# Patient Record
(demographics unavailable — no encounter records)

---

## 2025-04-07 NOTE — ASSESSMENT
[FreeTextEntry1] : Mr. HERNÁNDEZ is a 51 year-old male, with a past medical history as noted above, who present to the office today for fasting labs and a check up

## 2025-04-07 NOTE — CURRENT MEDS
[Side Effects] :  side effects [Yes] : Reviewed medication list for presence of high-risk medications. [Opioids] : opioids

## 2025-04-07 NOTE — HISTORY OF PRESENT ILLNESS
[FreeTextEntry1] : Fasting labs  [de-identified] : Mr. HERNÁNDEZ is a 51 year-old-male, with a past medical history as noted below, who present to the office today for fasting labs and a checkup. states he has been exercising routinely working on cutting back on his body fat and increase ROM States his BP is elevated secondary to only getting a few hours of sleep. States he worked a double shift.  When he checks his BP at home has been normal  Denies HA, CP,SOB/SIMPSON or recent infection - Having dental work done

## 2025-04-07 NOTE — PLAN
[FreeTextEntry1] : Cardiopulmonary -HTN  Patient was advised the importance of participating in aerobic exercise programs improve their stamina.  DEVAN was encouraged to continue with his exercise program Advised low sodium diet and try to keep his BMI under 25.  Check CBC, CMP, and FLP.   Refilled Losartan/ HCTZ- RTO 2-3 weeks for BP check if elevated start Norvasc.  Dermatology DEVAN was encouraged to wear sun protective clothing, sun block, and proper use of SPF board brim hats, to seek shade and to avoid the sun in peak hours.  ABCD of skin moles was advised.  Thinning hair-continue finasteride-Refilled medication today.   screening for prostate cancer - Check PSA  GI screening for colon cancer - Education given - Follow up with GI  Adult BMI screening and follow-up:  The patient's BMI is about 27. Counseled patient regarding BMI, healthy eating, portion control and exercise importance of diet to maintain a healthy weight.  Counseled patient on importance of exercise to maintain a healthy weight  Advised weight loss advised low-sodium diet advised to recreation to elevated blood pressure due to his weight gain.  I spent 30 Minutes with the patient, half of which we discussed finding on physical exam and coordinated care.  As well as reviewed my plans and follow ups. Dragon speech recognition software was used to create portions of this document.  An attempt at proofreading has been made to minimize errors please call if any questions arise.   RTO for BP check

## 2025-04-07 NOTE — COUNSELING
[Encouraged to increase physical activity] : Encouraged to increase physical activity [Good understanding] : Patient has a good understanding of disease, goals and obesity follow-up plan [FreeTextEntry2] : low nq

## 2025-04-07 NOTE — PHYSICAL EXAM
[No Acute Distress] : no acute distress [Well Nourished] : well nourished [Well Developed] : well developed [Well-Appearing] : well-appearing [Normal Sclera/Conjunctiva] : normal sclera/conjunctiva [PERRL] : pupils equal round and reactive to light [EOMI] : extraocular movements intact [Normal Outer Ear/Nose] : the outer ears and nose were normal in appearance [Normal Oropharynx] : the oropharynx was normal [Normal TMs] : both tympanic membranes were normal [Normal Nasal Mucosa] : the nasal mucosa was normal [No JVD] : no jugular venous distention [No Lymphadenopathy] : no lymphadenopathy [Supple] : supple [Thyroid Normal, No Nodules] : the thyroid was normal and there were no nodules present [No Respiratory Distress] : no respiratory distress  [No Accessory Muscle Use] : no accessory muscle use [Clear to Auscultation] : lungs were clear to auscultation bilaterally [Normal Rate] : normal rate  [Regular Rhythm] : with a regular rhythm [Normal S1, S2] : normal S1 and S2 [No Murmur] : no murmur heard [No Carotid Bruits] : no carotid bruits [No Abdominal Bruit] : a ~M bruit was not heard ~T in the abdomen [No Varicosities] : no varicosities [Pedal Pulses Present] : the pedal pulses are present [No Edema] : there was no peripheral edema [No Palpable Aorta] : no palpable aorta [No Extremity Clubbing/Cyanosis] : no extremity clubbing/cyanosis [Soft] : abdomen soft [Non Tender] : non-tender [Non-distended] : non-distended [No Masses] : no abdominal mass palpated [No HSM] : no HSM [Normal Bowel Sounds] : normal bowel sounds [Normal Posterior Cervical Nodes] : no posterior cervical lymphadenopathy [Normal Anterior Cervical Nodes] : no anterior cervical lymphadenopathy [No CVA Tenderness] : no CVA  tenderness [No Spinal Tenderness] : no spinal tenderness [No Joint Swelling] : no joint swelling [Grossly Normal Strength/Tone] : grossly normal strength/tone [No Rash] : no rash [Thin Hair] : thin hair [Both Ears Pierced] : both ears [Multiple Tattoos] : multiple tattoos observed [Coordination Grossly Intact] : coordination grossly intact [No Focal Deficits] : no focal deficits [Normal Gait] : normal gait [Deep Tendon Reflexes (DTR)] : deep tendon reflexes were 2+ and symmetric [Speech Grossly Normal] : speech grossly normal [Normal Affect] : the affect was normal [Alert and Oriented x3] : oriented to person, place, and time [Normal Mood] : the mood was normal [Normal Insight/Judgement] : insight and judgment were intact